# Patient Record
Sex: FEMALE | Race: AMERICAN INDIAN OR ALASKA NATIVE | NOT HISPANIC OR LATINO | URBAN - METROPOLITAN AREA
[De-identification: names, ages, dates, MRNs, and addresses within clinical notes are randomized per-mention and may not be internally consistent; named-entity substitution may affect disease eponyms.]

---

## 2017-05-20 ENCOUNTER — EMERGENCY (EMERGENCY)
Facility: HOSPITAL | Age: 22
LOS: 1 days | Discharge: HOME | End: 2017-05-20
Attending: EMERGENCY MEDICINE | Admitting: EMERGENCY MEDICINE
Payer: COMMERCIAL

## 2017-05-20 VITALS
OXYGEN SATURATION: 100 % | RESPIRATION RATE: 16 BRPM | TEMPERATURE: 98 F | HEART RATE: 80 BPM | SYSTOLIC BLOOD PRESSURE: 140 MMHG | DIASTOLIC BLOOD PRESSURE: 89 MMHG

## 2017-05-20 PROCEDURE — 99284 EMERGENCY DEPT VISIT MOD MDM: CPT | Mod: 25

## 2017-05-20 RX ORDER — CYCLOBENZAPRINE HYDROCHLORIDE 10 MG/1
5 TABLET, FILM COATED ORAL ONCE
Qty: 0 | Refills: 0 | Status: COMPLETED | OUTPATIENT
Start: 2017-05-20 | End: 2017-05-20

## 2017-05-20 RX ORDER — CYCLOBENZAPRINE HYDROCHLORIDE 10 MG/1
1 TABLET, FILM COATED ORAL
Qty: 15 | Refills: 0 | OUTPATIENT
Start: 2017-05-20 | End: 2017-05-25

## 2017-05-20 RX ORDER — IBUPROFEN 200 MG
600 TABLET ORAL ONCE
Qty: 0 | Refills: 0 | Status: COMPLETED | OUTPATIENT
Start: 2017-05-20 | End: 2017-05-20

## 2017-05-20 RX ORDER — IBUPROFEN 200 MG
1 TABLET ORAL
Qty: 20 | Refills: 0 | OUTPATIENT
Start: 2017-05-20 | End: 2017-05-25

## 2017-05-20 RX ORDER — CYCLOBENZAPRINE HYDROCHLORIDE 10 MG/1
5 TABLET, FILM COATED ORAL ONCE
Qty: 0 | Refills: 0 | Status: DISCONTINUED | OUTPATIENT
Start: 2017-05-20 | End: 2017-05-20

## 2017-05-20 RX ORDER — IBUPROFEN 200 MG
600 TABLET ORAL ONCE
Qty: 0 | Refills: 0 | Status: DISCONTINUED | OUTPATIENT
Start: 2017-05-20 | End: 2017-05-20

## 2017-05-20 RX ADMIN — Medication 600 MILLIGRAM(S): at 08:43

## 2017-05-20 RX ADMIN — CYCLOBENZAPRINE HYDROCHLORIDE 5 MILLIGRAM(S): 10 TABLET, FILM COATED ORAL at 08:43

## 2017-05-20 NOTE — ED ADULT NURSE NOTE - OBJECTIVE STATEMENT
Rec'd in [place] 8. CC constant left shoulder/ subscapular pain x 1 day. Reports heavy lifting. Denies trauma/injury/fall. Aox3. No acute distress. Pt has full ROM. Reports localized tenderness to left lower scapular. Family at bedside. Educated to avoid use heavy mechinery/driving.

## 2017-05-20 NOTE — ED PROVIDER NOTE - ATTENDING CONTRIBUTION TO CARE
DR. FUENTES, ATTENDING MD-  I performed a face to face bedside interview with patient regarding history of present illness, review of symptoms and past medical history. I completed an independent physical exam.  I have discussed patient's plan of care with the resident.   Documentation as above in the note.    20 y/o female c/o left shoulder/back pain x1 day.  Woke up with sharp pain to subscapular region.  Denies f/c, ha, neck stiffness, cp, sob, cough, abd pain, n/v/d, dysuria, rash.  Afebrile, vs wnl, nad, ttp at left subscapular region, no crepitus, no ecchymoses, full rom at shoulder with pain, ctabil, s1s2 rrr no m/r/g, abd soft non ttp no r/g, no cva tenderness b/l, no leg swelling b/l, no rash.  Muscle strain/spasm.  NSAIDS, flexeril, pcp f/u.

## 2017-05-20 NOTE — ED PROVIDER NOTE - CARE PLAN
Principal Discharge DX:	Acute pain of left shoulder  Goal:	Pain control  Instructions for follow-up, activity and diet:	At this time will given patient both pain control and muscle relaxant. If pain resolved will send home with script for flexeril and Motrin.

## 2017-05-20 NOTE — ED ADULT TRIAGE NOTE - CHIEF COMPLAINT QUOTE
Pt arrives to triage c/o L Shoulder radiating down back pain since yesterday AM when woke up, worsening til today.  Denies Trauma/Falls or gym/sports activity.  PMHx seizures which resolved at 7 y/o. Took 2 Ibuprofen yesterday evening w/o relief.  +ROM shoulder/arm, does not appear dislocated.  Pt appearing calm and in NAD in triage.

## 2017-05-20 NOTE — ED PROVIDER NOTE - PLAN OF CARE
Pain control At this time will given patient both pain control and muscle relaxant. If pain resolved will send home with script for flexeril and Motrin.

## 2017-07-19 NOTE — ED PROVIDER NOTE - CONSTITUTIONAL, MLM
No normal... Well appearing, well nourished, awake, alert, oriented to person, place, time/situation and in no apparent distress.